# Patient Record
Sex: FEMALE | Race: OTHER | Employment: UNEMPLOYED | URBAN - METROPOLITAN AREA
[De-identification: names, ages, dates, MRNs, and addresses within clinical notes are randomized per-mention and may not be internally consistent; named-entity substitution may affect disease eponyms.]

---

## 2022-05-04 ENCOUNTER — CONSULTATION/EVALUATION (OUTPATIENT)
Dept: URBAN - METROPOLITAN AREA CLINIC 39 | Facility: CLINIC | Age: 42
End: 2022-05-04

## 2022-05-04 DIAGNOSIS — H02.832: ICD-10-CM

## 2022-05-04 DIAGNOSIS — H02.835: ICD-10-CM

## 2022-05-04 PROCEDURE — 99499 UNLISTED E&M SERVICE: CPT

## 2022-05-04 ASSESSMENT — VISUAL ACUITY
OD_SC: 20/25
OS_SC: 20/30-1

## 2022-05-04 NOTE — PATIENT DISCUSSION
do not recommend  BLL Bleph TCL. Discussed fat draping for lower lids, but not good surgery candidate.